# Patient Record
Sex: FEMALE | Race: WHITE | NOT HISPANIC OR LATINO | Employment: OTHER | URBAN - NONMETROPOLITAN AREA
[De-identification: names, ages, dates, MRNs, and addresses within clinical notes are randomized per-mention and may not be internally consistent; named-entity substitution may affect disease eponyms.]

---

## 2017-01-10 ENCOUNTER — OFFICE VISIT (OUTPATIENT)
Dept: NEUROLOGY | Facility: CLINIC | Age: 82
End: 2017-01-10

## 2017-01-10 VITALS
WEIGHT: 141 LBS | SYSTOLIC BLOOD PRESSURE: 110 MMHG | HEART RATE: 80 BPM | BODY MASS INDEX: 24.98 KG/M2 | HEIGHT: 63 IN | DIASTOLIC BLOOD PRESSURE: 78 MMHG

## 2017-01-10 DIAGNOSIS — I63.332 CEREBROVASCULAR ACCIDENT (CVA) DUE TO THROMBOSIS OF LEFT POSTERIOR CEREBRAL ARTERY (HCC): ICD-10-CM

## 2017-01-10 DIAGNOSIS — I82.5Z1 CHRONIC DEEP VEIN THROMBOSIS (DVT) OF DISTAL VEIN OF RIGHT LOWER EXTREMITY (HCC): ICD-10-CM

## 2017-01-10 DIAGNOSIS — C91.10 CLL (CHRONIC LYMPHOCYTIC LEUKEMIA) (HCC): ICD-10-CM

## 2017-01-10 DIAGNOSIS — M32.9 LUPUS (SYSTEMIC LUPUS ERYTHEMATOSUS) (HCC): ICD-10-CM

## 2017-01-10 DIAGNOSIS — I10 ESSENTIAL HYPERTENSION: ICD-10-CM

## 2017-01-10 DIAGNOSIS — I65.22 LEFT CAROTID STENOSIS: ICD-10-CM

## 2017-01-10 DIAGNOSIS — G45.9 TRANSIENT CEREBRAL ISCHEMIA, UNSPECIFIED TYPE: Primary | ICD-10-CM

## 2017-01-10 DIAGNOSIS — E78.5 DYSLIPIDEMIA: ICD-10-CM

## 2017-01-10 PROCEDURE — 99214 OFFICE O/P EST MOD 30 MIN: CPT | Performed by: CLINICAL NURSE SPECIALIST

## 2017-01-10 RX ORDER — DIAZEPAM 5 MG/1
5 TABLET ORAL 2 TIMES DAILY PRN
COMMUNITY

## 2017-01-10 RX ORDER — CHLORAL HYDRATE 500 MG
CAPSULE ORAL
COMMUNITY

## 2017-01-10 NOTE — PROGRESS NOTES
Subjective     Chief Complaint   Patient presents with   • Stroke     No new stroke like symptoms. Feels like she getting stronger.        Elizabeth Jones is a 83 y.o. female right handed retiree.  She is here today for follow up for TIA and stroke and was last seen 9/2016.   She was admitted 12/6/16 for abnormal speech and unfortunately was found to have a left occiptal stroke.  She has returned to her baseline.  She was discharged home but does continue to complain of imbalance but no falls. She lives alone.  At the time of her stroke she had gotten her medictions confused and had take extra doses of Xarelto.  She had no bleeding.  She was evaluated by Dr. GAMALIEL Camargo [facility].  Thre was concern for carotid stenosis and Dr. Watson was consulted. CTA neck showed 40-50% stenosis on RICAModerate calcified plaque present within the distal right common carotid artery in the region of the carotid bulb results in narrowing of 50% .  She did have hypercoag panel after discharge and I have reviewed and results are scanned.  The patient has returned to her baseline and has no complaints today.  Stroke   This is a new (left occipital stroke 12/ 2016) problem. The current episode started more than 1 month ago. The problem has been resolved (return to baseline). Pertinent negatives include no arthralgias, chest pain, coughing, fatigue, fever, headaches, myalgias, nausea, neck pain, numbness, sore throat, vomiting or weakness. Associated symptoms comments: Abnormal speech. Exacerbated by: dvt, carotid stenosis, dyslipidemia, HTN. Treatments tried: xarelto, statin. The treatment provided significant relief.        Current Outpatient Prescriptions   Medication Sig Dispense Refill   • aspirin  MG tablet Take 325 mg by mouth daily.     • diazePAM (VALIUM) 5 MG tablet Take 5 mg by mouth 2 (Two) Times a Day As Needed for anxiety.     • lisinopril-hydrochlorothiazide (PRINZIDE,ZESTORETIC) 10-12.5 MG per tablet Take 1 tablet by  mouth daily.     • Multiple Vitamins-Minerals (MULTIVITAMIN ADULT PO) Take  by mouth.     • Omega-3 Fatty Acids (FISH OIL) 1000 MG capsule capsule Take  by mouth Daily With Breakfast.     • omeprazole (PriLOSEC) 20 MG capsule Take 20 mg by mouth daily.     • rivaroxaban (XARELTO) 20 MG tablet Take 20 mg by mouth daily.     • simvastatin (ZOCOR) 20 MG tablet Take 40 mg by mouth Every Night.       No current facility-administered medications for this visit.        Past Medical History   Diagnosis Date   • CLL (chronic lymphocytic leukemia)    • Dyslipidemia    • Hypertension    • Lupus    • Shingles    • Sjogren-Trey syndrome    • Stroke    • TIA (transient ischemic attack)    • TIA (transient ischemic attack)        Past Surgical History   Procedure Laterality Date   • Appendectomy     • Hysterectomy     • Tonsillectomy         family history includes Cancer in her father; Heart disease in her mother.    Social History   Substance Use Topics   • Smoking status: Never Smoker   • Smokeless tobacco: Never Used   • Alcohol use No       Review of Systems   Constitutional: Negative.  Negative for fatigue and fever.   HENT: Negative.  Negative for nosebleeds, postnasal drip and sore throat.    Eyes: Negative.  Negative for visual disturbance.   Respiratory: Negative.  Negative for apnea, cough and shortness of breath.    Cardiovascular: Negative.  Negative for chest pain and leg swelling.   Gastrointestinal: Negative.  Negative for blood in stool, constipation, diarrhea, nausea and vomiting.   Endocrine: Negative.    Genitourinary: Negative.  Negative for dysuria.   Musculoskeletal: Positive for gait problem (imbalance). Negative for arthralgias, myalgias and neck pain.   Skin: Negative.    Allergic/Immunologic: Negative.    Neurological: Negative for dizziness, tremors, syncope, weakness, numbness and headaches.   Hematological: Negative.  Negative for adenopathy.   Psychiatric/Behavioral: Negative.  Negative for  "agitation and confusion.   All other systems reviewed and are negative.      Objective     Visit Vitals   • /78   • Pulse 80   • Ht 63\" (160 cm)   • Wt 141 lb (64 kg)   • BMI 24.98 kg/m2   , Body mass index is 24.98 kg/(m^2).    Physical Exam   Constitutional: She is oriented to person, place, and time. Vital signs are normal. She appears well-developed and well-nourished.   HENT:   Head: Normocephalic and atraumatic.   Right Ear: Hearing and external ear normal.   Left Ear: Hearing and external ear normal.   Nose: Nose normal.   Mouth/Throat: Uvula is midline, oropharynx is clear and moist and mucous membranes are normal.   Eyes: EOM and lids are normal. Pupils are equal, round, and reactive to light.   Neck: Trachea normal and normal range of motion. Neck supple. Carotid bruit is not present.   Cardiovascular: Normal rate, regular rhythm, S1 normal and S2 normal.    Murmur heard.  Pulmonary/Chest: Effort normal and breath sounds normal.   Abdominal: Soft. Bowel sounds are normal.   Musculoskeletal: Normal range of motion.   Neurological: She is alert and oriented to person, place, and time. She has normal strength and normal reflexes. No cranial nerve deficit or sensory deficit. She displays a negative Romberg sign. Gait (imbalance with gait) abnormal. Abnormal coordination: no ataxia finger to nose intact but difficulty wiht heel to shin.   Reflex Scores:       Tricep reflexes are 2+ on the right side and 2+ on the left side.       Bicep reflexes are 2+ on the right side and 2+ on the left side.       Brachioradialis reflexes are 2+ on the right side and 2+ on the left side.       Patellar reflexes are 2+ on the right side and 2+ on the left side.       Achilles reflexes are 2+ on the right side and 2+ on the left side.  CN II:  Visual fields full.  Pupils equally reactive to light  CN III, IV, VI:  Extraocular Muscles full with no signs of nystagmus  CN V:  Facial sensory is symmetric with no " asymetries.  CN VII:  Facial motor symmetric  CN VIII:  Gross hearing intact bilaterally  CN IX:  Palate elevates symmetrically  CN X:  Palate elevates symmetrically  CN XI:  Shoulder shrug symmetric  CN XII:  Tongue is midline on protrusion   Skin: Skin is warm and dry.   Psychiatric: She has a normal mood and affect. Her speech is normal and behavior is normal. Cognition and memory are normal.   Nursing note and vitals reviewed.      Results for orders placed or performed during the hospital encounter of 12/06/16   Comprehensive Metabolic Panel   Result Value Ref Range    Glucose 125 (H) 70 - 100 mg/dL    BUN 28 (H) 5 - 21 mg/dL    Creatinine 1.11 0.50 - 1.40 mg/dL    Sodium 134 (L) 135 - 145 mmol/L    Potassium 4.0 3.5 - 5.3 mmol/L    Chloride 95 (L) 98 - 110 mmol/L    CO2 26.0 24.0 - 31.0 mmol/L    Calcium 9.7 8.4 - 10.4 mg/dL    Total Protein 8.0 6.3 - 8.7 g/dL    Albumin 4.00 3.50 - 5.00 g/dL    ALT (SGPT) 33 0 - 54 U/L    AST (SGOT) 27 7 - 45 U/L    Alkaline Phosphatase 65 24 - 120 U/L    Total Bilirubin 0.4 0.1 - 1.0 mg/dL    eGFR Non African Amer 47 (L) >60 mL/min/1.73    Globulin 4.0 gm/dL    A/G Ratio 1.0 (L) 1.1 - 2.5 g/dL    BUN/Creatinine Ratio 25.2 (H) 7.0 - 25.0    Anion Gap 13.0 4.0 - 13.0 mmol/L   Protime-INR   Result Value Ref Range    Protime 18.9 (H) 11.9 - 14.6 Seconds    INR 1.53 (H) 0.91 - 1.09   aPTT   Result Value Ref Range    PTT 34.9 (H) 24.1 - 34.8 seconds   D-dimer, Quantitative   Result Value Ref Range    D-Dimer, Quantitative 0.61 (H) 0.00 - 0.50 mg/L (FEU)   BNP   Result Value Ref Range    proBNP 143.0 0.0 - 1800.0 pg/mL   Urinalysis With / Culture If Indicated   Result Value Ref Range    Color, UA Yellow Yellow, Straw    Appearance, UA Clear Clear    pH, UA 5.5 5.0 - 8.0    Specific Gravity, UA 1.017 1.005 - 1.030    Glucose, UA Negative Negative    Ketones, UA Negative Negative    Bilirubin, UA Negative Negative    Blood, UA Negative Negative    Protein, UA Negative Negative     Leuk Esterase, UA Negative Negative    Nitrite, UA Negative Negative    Urobilinogen, UA 0.2 E.U./dL 0.2 - 1.0 E.U./dL   Magnesium   Result Value Ref Range    Magnesium 1.7 1.4 - 2.2 mg/dL   CBC Auto Differential   Result Value Ref Range    WBC 5.94 4.80 - 10.80 10*3/mm3    RBC 4.09 (L) 4.20 - 5.40 10*6/mm3    Hemoglobin 11.7 (L) 12.0 - 16.0 g/dL    Hematocrit 35.1 (L) 37.0 - 47.0 %    MCV 85.8 82.0 - 98.0 fL    MCH 28.6 28.0 - 32.0 pg    MCHC 33.3 33.0 - 36.0 g/dL    RDW 15.2 (H) 12.0 - 15.0 %    RDW-SD 47.4 40.0 - 54.0 fl    MPV 10.5 6.0 - 12.0 fL    Platelets 290 130 - 400 10*3/mm3    Neutrophil % 66.0 39.0 - 78.0 %    Lymphocyte % 23.9 15.0 - 45.0 %    Monocyte % 9.1 4.0 - 12.0 %    Eosinophil % 0.5 0.0 - 4.0 %    Basophil % 0.3 0.0 - 2.0 %    Immature Grans % 0.2 0.0 - 5.0 %    Neutrophils, Absolute 3.92 1.87 - 8.40 10*3/mm3    Lymphocytes, Absolute 1.42 0.72 - 4.86 10*3/mm3    Monocytes, Absolute 0.54 0.19 - 1.30 10*3/mm3    Eosinophils, Absolute 0.03 0.00 - 0.70 10*3/mm3    Basophils, Absolute 0.02 0.00 - 0.20 10*3/mm3    Immature Grans, Absolute 0.01 0.00 - 0.03 10*3/mm3   Basic Metabolic Panel   Result Value Ref Range    Glucose 94 70 - 100 mg/dL    BUN 25 (H) 5 - 21 mg/dL    Creatinine 1.10 0.50 - 1.40 mg/dL    Sodium 136 135 - 145 mmol/L    Potassium 4.0 3.5 - 5.3 mmol/L    Chloride 98 98 - 110 mmol/L    CO2 31.0 24.0 - 31.0 mmol/L    Calcium 9.8 8.4 - 10.4 mg/dL    eGFR Non African Amer 47 (L) >60 mL/min/1.73    BUN/Creatinine Ratio 22.7 7.0 - 25.0    Anion Gap 7.0 4.0 - 13.0 mmol/L   Magnesium   Result Value Ref Range    Magnesium 1.8 1.4 - 2.2 mg/dL   Phosphorus   Result Value Ref Range    Phosphorus 3.2 2.5 - 4.5 mg/dL   Phosphorus   Result Value Ref Range    Phosphorus 3.6 2.5 - 4.5 mg/dL   Lipid Panel   Result Value Ref Range    Total Cholesterol 172 130 - 200 mg/dL    Triglycerides 125 0 - 149 mg/dL    HDL Cholesterol 31 (L) >=50 mg/dL    LDL Cholesterol  90 0 - 99 mg/dL    LDL/HDL Ratio 3.74     Basic Metabolic Panel   Result Value Ref Range    Glucose 92 70 - 100 mg/dL    BUN 23 (H) 5 - 21 mg/dL    Creatinine 1.11 0.50 - 1.40 mg/dL    Sodium 138 135 - 145 mmol/L    Potassium 4.1 3.5 - 5.3 mmol/L    Chloride 100 98 - 110 mmol/L    CO2 29.0 24.0 - 31.0 mmol/L    Calcium 9.4 8.4 - 10.4 mg/dL    eGFR Non African Amer 47 (L) >60 mL/min/1.73    BUN/Creatinine Ratio 20.7 7.0 - 25.0    Anion Gap 9.0 4.0 - 13.0 mmol/L   Basic Metabolic Panel   Result Value Ref Range    Glucose 96 70 - 100 mg/dL    BUN 21 5 - 21 mg/dL    Creatinine 1.18 0.50 - 1.40 mg/dL    Sodium 137 135 - 145 mmol/L    Potassium 4.0 3.5 - 5.3 mmol/L    Chloride 100 98 - 110 mmol/L    CO2 27.0 24.0 - 31.0 mmol/L    Calcium 9.2 8.4 - 10.4 mg/dL    eGFR Non African Amer 44 (L) >60 mL/min/1.73    BUN/Creatinine Ratio 17.8 7.0 - 25.0    Anion Gap 10.0 4.0 - 13.0 mmol/L   POC Troponin, Rapid   Result Value Ref Range    Troponin I 0.00 0.00 - 0.60 ng/mL   Echocardiogram 2D complete   Result Value Ref Range    IVSd 1.1 cm    LVIDd 3.6 cm    LVIDs 1.8 cm    LVPWd 1.2 cm    IVS/LVPW 0.98     FS 50.3 %    EDV(Teich) 54.4 ml    ESV(Teich) 9.6 ml    EF(Teich) 82.4 %    EDV(cubed) 46.7 ml    ESV(cubed) 5.7 ml    EF(cubed) 87.7 %    LV mass(C)d 132.7 grams    SV(Teich) 44.9 ml    SV(cubed) 40.9 ml    Ao root diam 2.8 cm    Ao root area 6.2 cm^2    LA dimension 3.0 cm    LA/Ao 1.1     LVOT diam 2.0 cm    LVOT area 3.1 cm^2    LVOT area(traced) 3.1 cm^2    MV E max harish 68.4 cm/sec    MV A max harish 115.0 cm/sec    MV E/A 0.59     MV dec time 0.25 sec    Ao pk harish 230.5 cm/sec    Ao max PG 21.3 mmHg    Ao max PG (full) 18.6 mmHg    Ao V2 mean 177.8 cm/sec    Ao mean PG 14.3 mmHg    Ao mean PG (full) 12.3 mmHg    Ao V2 VTI 53.2 cm    SHASHANK(I,A) 1.0 cm^2    SHASHANK(I,D) 1.0 cm^2    SHASHANK(V,A) 1.1 cm^2    SHASHANK(V,D) 1.1 cm^2    LV V1 max PG 2.7 mmHg    LV V1 mean PG 2.0 mmHg    LV V1 max 82.0 cm/sec    LV V1 mean 56.6 cm/sec    LV V1 VTI 17.2 cm    SV(Ao) 327.4 ml    SV(LVOT)  54.0 ml    LA volume 39.3 cm3    E/E' ratio 13.4     Lat Peak E' Dago 5.8 cm/sec    Med Peak E' Dago 5.11 cm/sec      The patient did have a hypercoagulable panel that was sent to me and scanned. Panel is negative with elevated AT3 which is expected with a patient taking Xarelto.    MRI BRAIN: IMPRESSIONS:  1. A small cluster of approximately four areas of subacute to acute  nonhemorrhagic infarction involving the left occipital lobe.  2. Extensive probable chronic small vessel changes in both hemispheres  and within the brainstem.  3. Scattered areas of small lacunar infarct on the right and left as  described.  4. Atrophy. No hemorrhage or midline shift.  5. No major vessel pathology demonstrated on enhanced images of the  vessels.   6. MRA images will be reported separately.  This report was finalized on 12/08/2016 16:01 by Dr. Dino Wiggins MD      CAROTID DUPLEX:IMPRESSION:  Impression:  1. There is 50-69% stenosis of the right internal carotid artery.  2. There is less than 50% stenosis of the left internal carotid artery.  3. Antegrade flow is demonstrated in bilateral vertebral arteries.      CTA NECK:IMPRESSION:  Atherosclerotic changes as described in detail above. Moderate calcified  plaque present within the distal right common carotid artery in the  region of the carotid bulb results in narrowing of 50%. Narrowing of the  proximal right internal carotid artery is 40-50%. Widely patent left  extracranial carotid arteries. Dominant right vertebral artery. No  aneurysm or dissection.      2DECHO:Interpretation Summary      · All left ventricular wall segments contract normally.  · Left ventricular diastolic dysfunction (grade I) consistent with impaired relaxation.  · Mild aortic valve regurgitation is present.      NORMAL LV AND RV SIZE AND SYSTOLIC FUNCTION  MILD AORTIC VALVE STENOSIS         CT HEAD:IMPRESSION:  Moderate cerebral and cerebellar volume loss with chronic microvascular  disease but no  evidence of acute intracranial process.        MRA HEAD: IMPRESSION:  There is diminished signal within the proximal basilar artery which may  indicate atherosclerotic change and/or flow artifact. The mid and distal  basilar artery appear normal. The bilateral posterior as well as the  middle and left cerebral arteries are normal in appearance. 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 No aneurysm.  This report was finalized on 2016 17:12 by Dr. Jen Barfield MD.    ASSESSMENT/PLAN    Diagnoses and all orders for this visit:    Transient cerebral ischemia, unspecified type    Essential hypertension    Chronic deep vein thrombosis (DVT) of distal vein of right lower extremity    CLL (chronic lymphocytic leukemia)    Lupus (systemic lupus erythematosus)    Cerebrovascular accident (CVA) due to thrombosis of left posterior cerebral artery    Left carotid stenosis    Other orders  -     Omega-3 Fatty Acids (FISH OIL) 1000 MG capsule capsule; Take  by mouth Daily With Breakfast.  -     diazePAM (VALIUM) 5 MG tablet; Take 5 mg by mouth 2 (Two) Times a Day As Needed for anxiety.    MEDICAL DECISION MAKIN. Continue with Xarelto  2. Continue with BP management for systolic JONATAN goal < 140.  3. Continue with statin therapy and LDL goal < 70  4. I had recommended home physical therapy but patient declines.  5. Patient is counseled on stroke signs and symptoms using FAST and Time Saved is Brain Saved.  6. Discussed secondary stroke prevention to include a systolic blood pressure goal of less than 140, LDL goal less than 70, and 30-40 minutes of heart rate elevating exercise 3-4 times per week. Continue antiplatelet.     Return in about 3 months (around 4/10/2017).        Wendy Soliz, LUC

## 2017-01-10 NOTE — PATIENT INSTRUCTIONS
Stroke Prevention  Some medical conditions and behaviors are associated with an increased chance of having a stroke. You may prevent a stroke by making healthy choices and managing medical conditions.  HOW CAN I REDUCE MY RISK OF HAVING A STROKE?   · Stay physically active. Get at least 30 minutes of activity on most or all days.  · Do not smoke. It may also be helpful to avoid exposure to secondhand smoke.  · Limit alcohol use. Moderate alcohol use is considered to be:  ¨ No more than 2 drinks per day for men.  ¨ No more than 1 drink per day for nonpregnant women.  · Eat healthy foods. This involves:  ¨ Eating 5 or more servings of fruits and vegetables a day.  ¨ Making dietary changes that address high blood pressure (hypertension), high cholesterol, diabetes, or obesity.  · Manage your cholesterol levels.  ¨ Making food choices that are high in fiber and low in saturated fat, trans fat, and cholesterol may control cholesterol levels.  ¨ Take any prescribed medicines to control cholesterol as directed by your health care provider.  · Manage your diabetes.  ¨ Controlling your carbohydrate and sugar intake is recommended to manage diabetes.  ¨ Take any prescribed medicines to control diabetes as directed by your health care provider.  · Control your hypertension.  ¨ Making food choices that are low in salt (sodium), saturated fat, trans fat, and cholesterol is recommended to manage hypertension.  ¨ Ask your health care provider if you need treatment to lower your blood pressure. Take any prescribed medicines to control hypertension as directed by your health care provider.  ¨ If you are 18-39 years of age, have your blood pressure checked every 3-5 years. If you are 40 years of age or older, have your blood pressure checked every year.  · Maintain a healthy weight.  ¨ Reducing calorie intake and making food choices that are low in sodium, saturated fat, trans fat, and cholesterol are recommended to manage  weight.  · Stop drug abuse.  · Avoid taking birth control pills.  ¨ Talk to your health care provider about the risks of taking birth control pills if you are over 35 years old, smoke, get migraines, or have ever had a blood clot.  · Get evaluated for sleep disorders (sleep apnea).  ¨ Talk to your health care provider about getting a sleep evaluation if you snore a lot or have excessive sleepiness.  · Take medicines only as directed by your health care provider.  ¨ For some people, aspirin or blood thinners (anticoagulants) are helpful in reducing the risk of forming abnormal blood clots that can lead to stroke. If you have the irregular heart rhythm of atrial fibrillation, you should be on a blood thinner unless there is a good reason you cannot take them.  ¨ Understand all your medicine instructions.  · Make sure that other conditions (such as anemia or atherosclerosis) are addressed.  SEEK IMMEDIATE MEDICAL CARE IF:   · You have sudden weakness or numbness of the face, arm, or leg, especially on one side of the body.  · Your face or eyelid droops to one side.  · You have sudden confusion.  · You have trouble speaking (aphasia) or understanding.  · You have sudden trouble seeing in one or both eyes.  · You have sudden trouble walking.  · You have dizziness.  · You have a loss of balance or coordination.  · You have a sudden, severe headache with no known cause.  · You have new chest pain or an irregular heartbeat.  Any of these symptoms may represent a serious problem that is an emergency. Do not wait to see if the symptoms will go away. Get medical help at once. Call your local emergency services (911 in [place] Do not drive yourself to the hospital.     This information is not intended to replace advice given to you by your health care provider. Make sure you discuss any questions you have with your health care provider.     Document Released: 01/25/2006 Document Revised: 01/08/2016 Document Reviewed:  06/20/2014  [employer] Interactive Patient Education ©2016 [employer]

## 2017-03-22 ENCOUNTER — TELEPHONE (OUTPATIENT)
Dept: NEUROLOGY | Facility: CLINIC | Age: 82
End: 2017-03-22

## 2017-04-11 ENCOUNTER — OFFICE VISIT (OUTPATIENT)
Dept: NEUROLOGY | Facility: CLINIC | Age: 82
End: 2017-04-11

## 2017-04-11 VITALS
DIASTOLIC BLOOD PRESSURE: 78 MMHG | WEIGHT: 147 LBS | SYSTOLIC BLOOD PRESSURE: 128 MMHG | HEART RATE: 84 BPM | BODY MASS INDEX: 26.05 KG/M2 | HEIGHT: 63 IN

## 2017-04-11 DIAGNOSIS — C91.10 CLL (CHRONIC LYMPHOCYTIC LEUKEMIA) (HCC): ICD-10-CM

## 2017-04-11 DIAGNOSIS — R26.89 IMBALANCE: ICD-10-CM

## 2017-04-11 DIAGNOSIS — I82.5Z1 CHRONIC DEEP VEIN THROMBOSIS (DVT) OF DISTAL VEIN OF RIGHT LOWER EXTREMITY (HCC): ICD-10-CM

## 2017-04-11 DIAGNOSIS — E78.5 DYSLIPIDEMIA: ICD-10-CM

## 2017-04-11 DIAGNOSIS — G60.9 IDIOPATHIC PERIPHERAL NEUROPATHY: ICD-10-CM

## 2017-04-11 DIAGNOSIS — I10 ESSENTIAL HYPERTENSION: ICD-10-CM

## 2017-04-11 DIAGNOSIS — G45.9 TRANSIENT CEREBRAL ISCHEMIA, UNSPECIFIED TYPE: Primary | ICD-10-CM

## 2017-04-11 DIAGNOSIS — I63.332 CEREBROVASCULAR ACCIDENT (CVA) DUE TO THROMBOSIS OF LEFT POSTERIOR CEREBRAL ARTERY (HCC): ICD-10-CM

## 2017-04-11 DIAGNOSIS — I65.22 LEFT CAROTID STENOSIS: ICD-10-CM

## 2017-04-11 PROCEDURE — 99213 OFFICE O/P EST LOW 20 MIN: CPT | Performed by: CLINICAL NURSE SPECIALIST

## 2017-04-11 NOTE — PROGRESS NOTES
Subjective     Chief Complaint   Patient presents with   • Neurologic Problem     3 month f/u stroke/Pt states that she feels that everything is the same/unchanged since her last visit.  Pt is R handed.       Elizabeth Jones is a 83 y.o. female right handed retiree.  She is here today for follow up for TIA and stroke. She was last seen 1/10/17 after having a stroke 12/2016. She has hx of TIA in 2015.  She has returned to her baseline. She has hx of DVT and does take Xarelto and denies bleeding.  She does complain of imbalance. She has history of peripheral neuropathy and this is non concerning to her and she denies discomfort. I had offered/recommended PT in the past and I have offered again today and patient declines.  She does have history of bilateral carotid stenosis.    She denies new stroke symptoms and has no complaints today.      Neurologic Problem   The patient's pertinent negatives include no syncope or weakness. Primary symptoms comment: peripheral neuropathy. This is a chronic problem. The current episode started more than 1 year ago. The neurological problem developed gradually. The problem has been gradually worsening since onset. There was lower extremity (bilateral) focality noted. Pertinent negatives include no chest pain, confusion, dizziness, fatigue, fever, headaches, nausea, neck pain, shortness of breath or vomiting. (Numbness of bilateral LE distally to proximal to below knees) Past treatments include nothing. Her past medical history is significant for a CVA. (Hx DVt, HTN,GERD)   Stroke   This is a new (left occipital stroke 12/ 2016, TIA 2015) problem. The current episode started more than 1 month ago. The problem has been resolved (return to baseline). Pertinent negatives include no arthralgias, chest pain, coughing, fatigue, fever, headaches, myalgias, nausea, neck pain, numbness, sore throat, vomiting or weakness. Associated symptoms comments: Abnormal speech. Exacerbated by: dvt, carotid  stenosis, dyslipidemia, HTN. Treatments tried: xarelto, statin. The treatment provided significant relief.        Current Outpatient Prescriptions   Medication Sig Dispense Refill   • aspirin  MG tablet Take 325 mg by mouth daily.     • diazePAM (VALIUM) 5 MG tablet Take 5 mg by mouth 2 (Two) Times a Day As Needed for anxiety.     • lisinopril-hydrochlorothiazide (PRINZIDE,ZESTORETIC) 10-12.5 MG per tablet Take 1 tablet by mouth daily.     • Multiple Vitamins-Minerals (MULTIVITAMIN ADULT PO) Take  by mouth.     • Omega-3 Fatty Acids (FISH OIL) 1000 MG capsule capsule Take  by mouth Daily With Breakfast.     • omeprazole (PriLOSEC) 20 MG capsule Take 20 mg by mouth daily.     • rivaroxaban (XARELTO) 20 MG tablet Take 20 mg by mouth daily.     • simvastatin (ZOCOR) 20 MG tablet Take 40 mg by mouth Every Night.       No current facility-administered medications for this visit.        Past Medical History:   Diagnosis Date   • CLL (chronic lymphocytic leukemia)    • Dyslipidemia    • Hypertension    • Lupus    • Shingles    • Sjogren-Trey syndrome    • Stroke    • TIA (transient ischemic attack)    • TIA (transient ischemic attack)        Past Surgical History:   Procedure Laterality Date   • APPENDECTOMY     • HYSTERECTOMY     • TONSILLECTOMY         family history includes Cancer in her father; Heart disease in her mother.    Social History   Substance Use Topics   • Smoking status: Never Smoker   • Smokeless tobacco: Never Used   • Alcohol use No       Review of Systems   Constitutional: Negative.  Negative for fatigue and fever.   HENT: Negative.  Negative for nosebleeds, postnasal drip and sore throat.    Eyes: Negative.  Negative for visual disturbance.   Respiratory: Negative.  Negative for apnea, cough and shortness of breath.    Cardiovascular: Negative.  Negative for chest pain and leg swelling.   Gastrointestinal: Negative.  Negative for blood in stool, constipation, diarrhea, nausea and vomiting.  "  Endocrine: Negative.    Genitourinary: Negative.  Negative for dysuria.   Musculoskeletal: Positive for gait problem (imbalance). Negative for arthralgias, myalgias and neck pain.   Skin: Negative.    Allergic/Immunologic: Negative.    Neurological: Negative for dizziness, tremors, syncope, weakness, numbness and headaches.   Hematological: Negative.  Negative for adenopathy.   Psychiatric/Behavioral: Negative.  Negative for agitation and confusion.   All other systems reviewed and are negative.      Objective     /78  Pulse 84  Ht 63\" (160 cm)  Wt 147 lb (66.7 kg)  BMI 26.04 kg/m2, Body mass index is 26.04 kg/(m^2).    Physical Exam   Constitutional: She is oriented to person, place, and time. Vital signs are normal. She appears well-developed and well-nourished.   HENT:   Head: Normocephalic and atraumatic.   Right Ear: Hearing and external ear normal.   Left Ear: Hearing and external ear normal.   Nose: Nose normal.   Mouth/Throat: Uvula is midline, oropharynx is clear and moist and mucous membranes are normal.   Eyes: EOM and lids are normal. Pupils are equal, round, and reactive to light.   Neck: Trachea normal and normal range of motion. Neck supple. Carotid bruit is not present.   Cardiovascular: Normal rate, regular rhythm, S1 normal and S2 normal.    Murmur heard.  Pulmonary/Chest: Effort normal and breath sounds normal.   Abdominal: Soft. Bowel sounds are normal.   Musculoskeletal: Normal range of motion.       Neurological Sensory Findings - Altered hot/cold left ankle/foot discrimination (distal to proximal to below the knee).Altered hot/cold right ankle/foot discrimination: distal to proximal to below the knee. Altered sharp/dull right ankle/foot discrimination (distal to proximal to below the knee) and altered sharp/dull left ankle/foot discrimination (distal to proximal to below the knee).  Neurological: She is alert and oriented to person, place, and time. She has normal strength and " normal reflexes. No cranial nerve deficit or sensory deficit. She displays a negative Romberg sign. Gait (imbalance with gait) abnormal. Abnormal coordination: no ataxia finger to nose intact but difficulty wiht heel to shin.   Reflex Scores:       Tricep reflexes are 2+ on the right side and 2+ on the left side.       Bicep reflexes are 2+ on the right side and 2+ on the left side.       Brachioradialis reflexes are 2+ on the right side and 2+ on the left side.       Patellar reflexes are 2+ on the right side and 2+ on the left side.       Achilles reflexes are 2+ on the right side and 2+ on the left side.  CN II:  Visual fields full.  Pupils equally reactive to light  CN III, IV, VI:  Extraocular Muscles full with no signs of nystagmus  CN V:  Facial sensory is symmetric with no asymetries.  CN VII:  Facial motor symmetric  CN VIII:  Gross hearing intact bilaterally  CN IX:  Palate elevates symmetrically  CN X:  Palate elevates symmetrically  CN XI:  Shoulder shrug symmetric  CN XII:  Tongue is midline on protrusion   Skin: Skin is warm and dry.   Psychiatric: She has a normal mood and affect. Her speech is normal and behavior is normal. Cognition and memory are normal.   Nursing note and vitals reviewed.           ASSESSMENT/PLAN    Diagnoses and all orders for this visit:    Transient cerebral ischemia, unspecified type    Essential hypertension    Cerebrovascular accident (CVA) due to thrombosis of left posterior cerebral artery    Left carotid stenosis    CLL (chronic lymphocytic leukemia)    Chronic deep vein thrombosis (DVT) of distal vein of right lower extremity    Dyslipidemia    Imbalance    Idiopathic peripheral neuropathy    MEDICAL DECISION MAKIN. Continue with Xarelto per PCP  2. Continue with BP management for systolic BP goal < 140. Managed by PCP  3. Continue with statin therapy and LDL goal < 70 managed by PCP  4. I had recommended home physical therapy but patient declines. I did explain  she is at risk for fall.  5. Patient is counseled on stroke signs and symptoms using FAST and Time Saved is Brain Saved.  6. I did consider treatment for peripheral neuropathy and really is not uncomfortable or concerning the the patient. Counseled on general treatment and care of feet   7. Patient denies back pain and declines further treatment at this time for peripheral neuropathy.  8. Monitor carotid with duplex scan annually which will be 12/2017.   9. Secondary stroke prevention discussed.      allergies and all known medications/prescriptions have been reviewed using resources available on this encounter.    Return in about 6 months (around 10/11/2017).        Wendy Soliz, APRN

## 2017-10-16 ENCOUNTER — OFFICE VISIT (OUTPATIENT)
Dept: NEUROLOGY | Facility: CLINIC | Age: 82
End: 2017-10-16

## 2017-10-16 VITALS
SYSTOLIC BLOOD PRESSURE: 120 MMHG | HEART RATE: 76 BPM | WEIGHT: 151 LBS | DIASTOLIC BLOOD PRESSURE: 80 MMHG | HEIGHT: 63 IN | BODY MASS INDEX: 26.75 KG/M2

## 2017-10-16 DIAGNOSIS — E78.5 DYSLIPIDEMIA: ICD-10-CM

## 2017-10-16 DIAGNOSIS — I10 ESSENTIAL HYPERTENSION: ICD-10-CM

## 2017-10-16 DIAGNOSIS — I63.332 CEREBROVASCULAR ACCIDENT (CVA) DUE TO THROMBOSIS OF LEFT POSTERIOR CEREBRAL ARTERY (HCC): Primary | ICD-10-CM

## 2017-10-16 DIAGNOSIS — I82.5Z1 CHRONIC DEEP VEIN THROMBOSIS (DVT) OF DISTAL VEIN OF RIGHT LOWER EXTREMITY (HCC): ICD-10-CM

## 2017-10-16 DIAGNOSIS — M32.9 SYSTEMIC LUPUS ERYTHEMATOSUS, UNSPECIFIED SLE TYPE, UNSPECIFIED ORGAN INVOLVEMENT STATUS (HCC): ICD-10-CM

## 2017-10-16 DIAGNOSIS — R26.89 IMBALANCE: ICD-10-CM

## 2017-10-16 DIAGNOSIS — I65.23 BILATERAL CAROTID ARTERY STENOSIS: ICD-10-CM

## 2017-10-16 PROCEDURE — 99214 OFFICE O/P EST MOD 30 MIN: CPT | Performed by: CLINICAL NURSE SPECIALIST

## 2017-10-16 RX ORDER — METOPROLOL SUCCINATE 25 MG/1
25 TABLET, EXTENDED RELEASE ORAL DAILY
COMMUNITY

## 2017-10-16 NOTE — PROGRESS NOTES
Subjective     Chief Complaint   Patient presents with   • Stroke     No new stroke symptoms   • Peripheral Neuropathy     Feet- this has not worsened but she is concerned with her swelling currently. She has NOT discussed this with her PCP yet. She does not have a f/u with him until first of the year.          Elizabeth Jones is a 84 y.o. female right handed retiree.  She is here today for follow up for TIA and stroke. She was last seen 4/17 after having a stroke 12/2016. She has hx of TIA in 2015.  She has returned to her baseline. She has hx of DVT and does take Xarelto and denies bleeding.  She does complain of imbalance. She has history of peripheral neuropathy and this is non concerning to her and she denies discomfort..  She does have history of bilateral carotid stenosis. She has noticed some pedal edema but has not seen her PCP. She does also have occasional right lower extremity pain and mild edema. She denies new stroke symptoms and denies unilateral weakness, numbness, facial weakness, speech changes.     Stroke   This is a new (left occipital stroke 12/ 2016, TIA 2015) problem. The current episode started more than 1 month ago. The problem has been resolved (return to baseline). Pertinent negatives include no arthralgias, chest pain, coughing, fatigue, fever, headaches, myalgias, nausea, neck pain, numbness, sore throat, vomiting or weakness. Associated symptoms comments: Abnormal speech. Exacerbated by: dvt, carotid stenosis, dyslipidemia, HTN. Treatments tried: xarelto, statin. The treatment provided significant relief.   Peripheral Neuropathy   This is a chronic problem. The current episode started more than 1 year ago. The problem occurs daily. Pertinent negatives include no arthralgias, chest pain, coughing, fatigue, fever, headaches, myalgias, nausea, neck pain, numbness, sore throat, vomiting or weakness. Associated symptoms comments: Numbness of bilateral LE distally to proximal to below knees.  She has tried nothing for the symptoms. The treatment provided significant relief.   Neurologic Problem   The patient's pertinent negatives include no syncope or weakness. Primary symptoms comment: peripheral neuropathy. This is a chronic problem. The current episode started more than 1 year ago. The neurological problem developed gradually. The problem has been gradually worsening since onset. There was lower extremity (bilateral) focality noted. Pertinent negatives include no chest pain, confusion, dizziness, fatigue, fever, headaches, nausea, neck pain, shortness of breath or vomiting. (Numbness of bilateral LE distally to proximal to below knees) Past treatments include nothing. Her past medical history is significant for a CVA. (Hx DVt, HTN,GERD)        Current Outpatient Prescriptions   Medication Sig Dispense Refill   • aspirin  MG tablet Take 325 mg by mouth daily.     • diazePAM (VALIUM) 5 MG tablet Take 5 mg by mouth 2 (Two) Times a Day As Needed for anxiety.     • metoprolol succinate XL (TOPROL-XL) 25 MG 24 hr tablet Take 25 mg by mouth Daily.     • Multiple Vitamins-Minerals (MULTIVITAMIN ADULT PO) Take  by mouth.     • Omega-3 Fatty Acids (FISH OIL) 1000 MG capsule capsule Take  by mouth Daily With Breakfast.     • omeprazole (PriLOSEC) 20 MG capsule Take 20 mg by mouth daily.     • rivaroxaban (XARELTO) 20 MG tablet Take 20 mg by mouth daily.     • simvastatin (ZOCOR) 20 MG tablet Take 40 mg by mouth Every Night.       No current facility-administered medications for this visit.        Past Medical History:   Diagnosis Date   • CLL (chronic lymphocytic leukemia)    • Dyslipidemia    • Hypertension    • Lupus    • Shingles    • Sjogren-Trey syndrome    • Stroke    • TIA (transient ischemic attack)    • TIA (transient ischemic attack)        Past Surgical History:   Procedure Laterality Date   • APPENDECTOMY     • HYSTERECTOMY     • TONSILLECTOMY         family history includes Cancer in her  "father; Heart disease in her mother.    Social History   Substance Use Topics   • Smoking status: Never Smoker   • Smokeless tobacco: Never Used   • Alcohol use No       Review of Systems   Constitutional: Negative.  Negative for fatigue, fever and unexpected weight change.   HENT: Negative.  Negative for nosebleeds, postnasal drip and sore throat.    Eyes: Negative.  Negative for visual disturbance.   Respiratory: Negative.  Negative for apnea, cough and shortness of breath.    Cardiovascular: Positive for leg swelling. Negative for chest pain.   Gastrointestinal: Negative.  Negative for blood in stool, constipation, diarrhea, nausea and vomiting.   Endocrine: Negative.    Genitourinary: Negative.  Negative for dysuria and frequency.   Musculoskeletal: Positive for gait problem (imbalance). Negative for arthralgias, myalgias and neck pain.   Skin: Negative.    Allergic/Immunologic: Negative.    Neurological: Negative for dizziness, tremors, syncope, weakness, numbness and headaches.   Hematological: Negative.  Negative for adenopathy.   Psychiatric/Behavioral: Negative.  Negative for agitation and confusion.   All other systems reviewed and are negative.      Objective     /80  Pulse 76  Ht 63\" (160 cm)  Wt 151 lb (68.5 kg)  BMI 26.75 kg/m2, Body mass index is 26.75 kg/(m^2).    Physical Exam   Constitutional: She is oriented to person, place, and time. Vital signs are normal. She appears well-developed and well-nourished.   HENT:   Head: Normocephalic and atraumatic.   Right Ear: Hearing and external ear normal.   Left Ear: Hearing and external ear normal.   Nose: Nose normal.   Mouth/Throat: Uvula is midline, oropharynx is clear and moist and mucous membranes are normal.   Eyes: Conjunctivae, EOM and lids are normal. Pupils are equal, round, and reactive to light.   Neck: Trachea normal and normal range of motion. Neck supple. Carotid bruit is not present.   Cardiovascular: Normal rate, regular rhythm, " S1 normal and S2 normal.    Murmur heard.  Pulses:       Dorsalis pedis pulses are 1+ on the right side, and 1+ on the left side.        Posterior tibial pulses are 1+ on the right side, and 1+ on the left side.   Pulmonary/Chest: Effort normal and breath sounds normal.   Abdominal: Soft. Bowel sounds are normal.   Musculoskeletal: Normal range of motion.       Neurological Sensory Findings - Altered hot/cold left ankle/foot discrimination (distal to proximal to below the knee).Altered hot/cold right ankle/foot discrimination: distal to proximal to below the knee. Altered sharp/dull right ankle/foot discrimination (distal to proximal to below the knee) and altered sharp/dull left ankle/foot discrimination (distal to proximal to below the knee).    Vascular Status -  Her exam exhibits right foot edema (1+). Her exam exhibits left foot edema (1+).  Neurological: She is alert and oriented to person, place, and time. She has normal strength and normal reflexes. No cranial nerve deficit or sensory deficit. She displays a negative Romberg sign. Gait (imbalance with gait) abnormal. Abnormal coordination: no ataxia finger to nose intact but difficulty wiht heel to shin.   Reflex Scores:       Tricep reflexes are 2+ on the right side and 2+ on the left side.       Bicep reflexes are 2+ on the right side and 2+ on the left side.       Brachioradialis reflexes are 2+ on the right side and 2+ on the left side.       Patellar reflexes are 2+ on the right side and 2+ on the left side.       Achilles reflexes are 2+ on the right side and 2+ on the left side.  CN II:  Visual fields full.  Pupils equally reactive to light  CN III, IV, VI:  Extraocular Muscles full with no signs of nystagmus  CN V:  Facial sensory is symmetric with no asymetries.  CN VII:  Facial motor symmetric  CN VIII:  Gross hearing intact bilaterally  CN IX:  Palate elevates symmetrically  CN X:  Palate elevates symmetrically  CN XI:  Shoulder shrug  symmetric  CN XII:  Tongue is midline on protrusion   Skin: Skin is warm and dry.   Psychiatric: She has a normal mood and affect. Her speech is normal and behavior is normal. Cognition and memory are normal.   Nursing note and vitals reviewed.           ASSESSMENT/PLAN    Diagnoses and all orders for this visit:    Cerebrovascular accident (CVA) due to thrombosis of left posterior cerebral artery    Essential hypertension    Systemic lupus erythematosus, unspecified SLE type, unspecified organ involvement status    Dyslipidemia    Imbalance    Bilateral carotid artery stenosis  -     US Carotid Bilateral; Future    Chronic deep vein thrombosis (DVT) of distal vein of right lower extremity  -     US venous doppler lower extremity bilateral (duplex); Future    Other orders  -     metoprolol succinate XL (TOPROL-XL) 25 MG 24 hr tablet; Take 25 mg by mouth Daily.    MEDICAL DECISION MAKIN. Continue with Xarelto per PCP  2. Continue with BP management for systolic BP goal < 140. Managed by PCP  3. Continue with statin therapy and LDL goal < 70 managed by PCP  4. Obtain carotid duplex scan and venous dopler bilateral LE hx DVT for pain/mild edema RLE  5. Patient is counseled on stroke signs and symptoms using FAST and Time Saved is Brain Saved.  6. I did consider treatment for peripheral neuropathy and really is not uncomfortable or concerning the the patient. Counseled on general treatment and care of feet   7. Patient denies back pain and declines further treatment at this time for peripheral neuropathy.  8. Repeat carotid duplex  9. Secondary stroke prevention discussed.         allergies and all known medications/prescriptions have been reviewed using resources available on this encounter.    Return in about 6 months (around 2018).        LUC Hay

## 2017-10-16 NOTE — PATIENT INSTRUCTIONS
BMI for Adults  Body mass index (BMI) is a number that is calculated from a person's weight and height. In most adults, the number is used to find how much of an adult's weight is made up of fat. BMI is not as accurate as a direct measure of body fat.  HOW IS BMI CALCULATED?  BMI is calculated by dividing weight in kilograms by height in meters squared. It can also be calculated by dividing weight in pounds by height in inches squared, then multiplying the resulting number by 703. Charts are available to help you find your BMI quickly and easily without doing this calculation.   HOW IS BMI INTERPRETED?  Health care professionals use BMI charts to identify whether an adult is underweight, at a normal weight, or overweight based on the following guidelines:  · Underweight: BMI less than 18.5.  · Normal weight: BMI between 18.5 and 24.9.  · Overweight: BMI between 25 and 29.9.  · Obese: BMI of 30 and above.  BMI is usually interpreted the same for males and females.  Weight includes both fat and muscle, so someone with a muscular build, such as an athlete, may have a BMI that is higher than 24.9. In cases like these, BMI may not accurately depict body fat. To determine if excess body fat is the cause of a BMI of 25 or higher, further assessments may need to be done by a health care provider.  WHY IS BMI A USEFUL TOOL?  BMI is used to identify a possible weight problem that may be related to a medical problem or may increase the risk for medical problems. BMI can also be used to promote changes to reach a healthy weight.     This information is not intended to replace advice given to you by your health care provider. Make sure you discuss any questions you have with your health care provider.     Document Released: 08/29/2005 Document Revised: 01/08/2016 Document Reviewed: 05/15/2015  [employer] Interactive Patient Education ©2017 [employer]  Stroke Prevention  Some medical conditions and behaviors are associated with  an increased chance of having a stroke. You may prevent a stroke by making healthy choices and managing medical conditions.  HOW CAN I REDUCE MY RISK OF HAVING A STROKE?   · Stay physically active. Get at least 30 minutes of activity on most or all days.  · Do not smoke. It may also be helpful to avoid exposure to secondhand smoke.  · Limit alcohol use. Moderate alcohol use is considered to be:  ¨ No more than 2 drinks per day for men.  ¨ No more than 1 drink per day for nonpregnant women.  · Eat healthy foods. This involves:  ¨ Eating 5 or more servings of fruits and vegetables a day.  ¨ Making dietary changes that address high blood pressure (hypertension), high cholesterol, diabetes, or obesity.  · Manage your cholesterol levels.  ¨ Making food choices that are high in fiber and low in saturated fat, trans fat, and cholesterol may control cholesterol levels.  ¨ Take any prescribed medicines to control cholesterol as directed by your health care provider.  · Manage your diabetes.  ¨ Controlling your carbohydrate and sugar intake is recommended to manage diabetes.  ¨ Take any prescribed medicines to control diabetes as directed by your health care provider.  · Control your hypertension.  ¨ Making food choices that are low in salt (sodium), saturated fat, trans fat, and cholesterol is recommended to manage hypertension.  ¨ Ask your health care provider if you need treatment to lower your blood pressure. Take any prescribed medicines to control hypertension as directed by your health care provider.  ¨ If you are 18-39 years of age, have your blood pressure checked every 3-5 years. If you are 40 years of age or older, have your blood pressure checked every year.  · Maintain a healthy weight.  ¨ Reducing calorie intake and making food choices that are low in sodium, saturated fat, trans fat, and cholesterol are recommended to manage weight.  · Stop drug abuse.  · Avoid taking birth control pills.  ¨ Talk to your health  care provider about the risks of taking birth control pills if you are over 35 years old, smoke, get migraines, or have ever had a blood clot.  · Get evaluated for sleep disorders (sleep apnea).  ¨ Talk to your health care provider about getting a sleep evaluation if you snore a lot or have excessive sleepiness.  · Take medicines only as directed by your health care provider.  ¨ For some people, aspirin or blood thinners (anticoagulants) are helpful in reducing the risk of forming abnormal blood clots that can lead to stroke. If you have the irregular heart rhythm of atrial fibrillation, you should be on a blood thinner unless there is a good reason you cannot take them.  ¨ Understand all your medicine instructions.  · Make sure that other conditions (such as anemia or atherosclerosis) are addressed.  SEEK IMMEDIATE MEDICAL CARE IF:   · You have sudden weakness or numbness of the face, arm, or leg, especially on one side of the body.  · Your face or eyelid droops to one side.  · You have sudden confusion.  · You have trouble speaking (aphasia) or understanding.  · You have sudden trouble seeing in one or both eyes.  · You have sudden trouble walking.  · You have dizziness.  · You have a loss of balance or coordination.  · You have a sudden, severe headache with no known cause.  · You have new chest pain or an irregular heartbeat.  Any of these symptoms may represent a serious problem that is an emergency. Do not wait to see if the symptoms will go away. Get medical help at once. Call your local emergency services (911 in [place] Do not drive yourself to the hospital.     This information is not intended to replace advice given to you by your health care provider. Make sure you discuss any questions you have with your health care provider.     Document Released: 01/25/2006 Document Revised: 01/08/2016 Document Reviewed: 06/20/2014  [employer] Interactive Patient Education ©2017 [employer]

## 2017-10-24 ENCOUNTER — HOSPITAL ENCOUNTER (OUTPATIENT)
Dept: ULTRASOUND IMAGING | Facility: HOSPITAL | Age: 82
Discharge: HOME | End: 2017-10-24

## 2017-10-24 ENCOUNTER — HOSPITAL ENCOUNTER (EMERGENCY)
Facility: HOSPITAL | Age: 82
Discharge: STILL ADMITTED OR ADMINISTRATIVE | End: 2017-10-24

## 2017-10-24 ENCOUNTER — HOSPITAL ENCOUNTER (OUTPATIENT)
Dept: ULTRASOUND IMAGING | Facility: HOSPITAL | Age: 82
Discharge: HOME | End: 2017-10-24
Admitting: CLINICAL NURSE SPECIALIST

## 2017-10-24 DIAGNOSIS — I65.23 BILATERAL CAROTID ARTERY STENOSIS: ICD-10-CM

## 2017-10-24 DIAGNOSIS — I82.5Z1 CHRONIC DEEP VEIN THROMBOSIS (DVT) OF DISTAL VEIN OF RIGHT LOWER EXTREMITY (HCC): ICD-10-CM

## 2017-10-24 PROCEDURE — 93970 EXTREMITY STUDY: CPT | Performed by: SURGERY

## 2017-10-24 PROCEDURE — 93880 EXTRACRANIAL BILAT STUDY: CPT

## 2017-10-24 PROCEDURE — 93880 EXTRACRANIAL BILAT STUDY: CPT | Performed by: SURGERY

## 2017-10-24 PROCEDURE — 93970 EXTREMITY STUDY: CPT

## 2017-10-25 ENCOUNTER — TELEPHONE (OUTPATIENT)
Dept: NEUROLOGY | Facility: CLINIC | Age: 82
End: 2017-10-25

## 2017-10-25 DIAGNOSIS — I65.23 BILATERAL CAROTID ARTERY STENOSIS: Primary | ICD-10-CM

## 2017-10-25 NOTE — TELEPHONE ENCOUNTER
----- Message from LUC George sent at 10/25/2017  9:16 AM CDT -----  I attempted to call patient and no answer. Please call patient that I would like to refer to vascular surgeryLIGIA also please instruct to f/u with PCP  For chronic DVT RLE (she takes xarelto) and send Le dopler to PCP. thanks

## 2017-10-25 NOTE — TELEPHONE ENCOUNTER
I did call and speak with Elizabeth. She did voice understanding and is agreeable with seeing a Vascular Surgeon. I did tell her that office will contact her to schedule an appointment.

## 2017-10-25 NOTE — TELEPHONE ENCOUNTER
----- Message from LUC George sent at 10/25/2017  9:18 AM CDT -----  Please send report to PCP and instruct patient to f/u with PCP ASAP. aly shows chronic DVT RLE and similar to last milton 2016.

## 2017-11-16 ENCOUNTER — OFFICE VISIT (OUTPATIENT)
Dept: VASCULAR SURGERY | Facility: CLINIC | Age: 82
End: 2017-11-16

## 2017-11-16 VITALS
HEART RATE: 96 BPM | SYSTOLIC BLOOD PRESSURE: 132 MMHG | BODY MASS INDEX: 26.22 KG/M2 | DIASTOLIC BLOOD PRESSURE: 78 MMHG | WEIGHT: 148 LBS | HEIGHT: 63 IN

## 2017-11-16 DIAGNOSIS — I10 ESSENTIAL HYPERTENSION: ICD-10-CM

## 2017-11-16 DIAGNOSIS — I63.332 CEREBROVASCULAR ACCIDENT (CVA) DUE TO THROMBOSIS OF LEFT POSTERIOR CEREBRAL ARTERY (HCC): ICD-10-CM

## 2017-11-16 DIAGNOSIS — I65.23 BILATERAL CAROTID ARTERY STENOSIS: Primary | ICD-10-CM

## 2017-11-16 DIAGNOSIS — I82.5Z1 CHRONIC DEEP VEIN THROMBOSIS (DVT) OF DISTAL VEIN OF RIGHT LOWER EXTREMITY (HCC): ICD-10-CM

## 2017-11-16 PROCEDURE — 99213 OFFICE O/P EST LOW 20 MIN: CPT | Performed by: NURSE PRACTITIONER

## 2017-11-16 NOTE — PROGRESS NOTES
11/16/2017      Wendy Soliz, APRN  [address]  [facility] [address]  [place] [address]    Elizabeth Jones  7/16/1933    Chief Complaint   Patient presents with   • Carotid Artery Disease     Denies new stroke/TIA symptoms.History of TIA.       Dear Wendy Soliz, APRN:      HPI  I had the pleasure of seeing your patient Elizabeth Jones in the office today.  Thank you kindly for this consultation.  As you recall, Elizabeth Jones is a 84 y.o.  female who you are currently following for stroke.  She has a history fo stroke in 12/2016, and TIA in 2015.  She also has a history of DVT, chronically taking Xarelto.  She denies any strokelike symptoms, however does complain of imbalance. She states she has had some trouble with inner ear problems previously.    Past Medical History:   Diagnosis Date   • Carotid stenosis    • CLL (chronic lymphocytic leukemia)    • Dyslipidemia    • Hypertension    • Lupus    • Shingles    • Sjogren-Trey syndrome    • Stroke    • TIA (transient ischemic attack)    • TIA (transient ischemic attack)        Past Surgical History:   Procedure Laterality Date   • APPENDECTOMY     • HYSTERECTOMY     • TONSILLECTOMY         Family History   Problem Relation Age of Onset   • Heart disease Mother    • Cancer Father        Social History     Social History   • Marital status:      Spouse name: N/A   • Number of children: N/A   • Years of education: N/A     Occupational History   • Not on file.     Social History Main Topics   • Smoking status: Never Smoker   • Smokeless tobacco: Never Used   • Alcohol use No   • Drug use: No   • Sexual activity: Defer     Other Topics Concern   • Not on file     Social History Narrative       Allergies   Allergen Reactions   • Penicillins    • Sulfa Antibiotics        Prior to Admission medications    Medication Sig Start Date End Date Taking? Authorizing Provider   aspirin  MG tablet Take 325 mg by mouth daily.   Yes Historical Provider, MD   diazePAM  "(VALIUM) 5 MG tablet Take 5 mg by mouth 2 (Two) Times a Day As Needed for anxiety.   Yes Historical Provider, MD   metoprolol succinate XL (TOPROL-XL) 25 MG 24 hr tablet Take 25 mg by mouth Daily.   Yes Historical Provider, MD   Multiple Vitamins-Minerals (MULTIVITAMIN ADULT PO) Take  by mouth.   Yes Historical Provider, MD   Omega-3 Fatty Acids (FISH OIL) 1000 MG capsule capsule Take  by mouth Daily With Breakfast.   Yes Historical Provider, MD   omeprazole (PriLOSEC) 20 MG capsule Take 20 mg by mouth daily.   Yes Historical Provider, MD   rivaroxaban (XARELTO) 20 MG tablet Take 20 mg by mouth daily.   Yes Historical Provider, MD   simvastatin (ZOCOR) 20 MG tablet Take 40 mg by mouth Every Night.   Yes Historical Provider, MD       Review of Systems   Constitutional: Negative.    HENT: Negative.    Eyes: Negative.    Respiratory: Negative.    Cardiovascular: Positive for leg swelling.   Gastrointestinal: Negative.    Endocrine: Negative.    Genitourinary: Negative.    Musculoskeletal: Negative.    Skin: Negative.    Allergic/Immunologic: Negative.    Neurological:        Imbalance   Hematological: Negative.    Psychiatric/Behavioral: Negative.        /78  Pulse 96  Ht 63\" (160 cm)  Wt 148 lb (67.1 kg)  BMI 26.22 kg/m2  Physical Exam   Constitutional: She is oriented to person, place, and time. She appears well-developed and well-nourished. No distress.   HENT:   Head: Normocephalic and atraumatic.   Mouth/Throat: Oropharynx is clear and moist.   Eyes: Pupils are equal, round, and reactive to light. No scleral icterus.   Neck: Normal range of motion. Neck supple. No JVD present. Carotid bruit is not present. No thyromegaly present.   Cardiovascular: Normal rate, regular rhythm, S2 normal, intact distal pulses and normal pulses.  Exam reveals no gallop and no friction rub.    Murmur heard.  Pulmonary/Chest: Effort normal and breath sounds normal.   Abdominal: Soft. Normal aorta and bowel sounds are normal. " There is no hepatosplenomegaly.   Musculoskeletal: Normal range of motion. She exhibits edema (mild BLE).   Neurological: She is alert and oriented to person, place, and time. No cranial nerve deficit.   Skin: Skin is warm and dry. She is not diaphoretic.   Psychiatric: She has a normal mood and affect. Her behavior is normal. Judgment and thought content normal.   Nursing note and vitals reviewed.      Us Carotid Bilateral    Result Date: 10/25/2017  Narrative: History: Carotid occlusive disease      Impression: Impression: 1. There is 50-69% stenosis of the right internal carotid artery. 2. There is 50-69% stenosis of the left internal carotid artery. 3. Antegrade flow is demonstrated in bilateral vertebral arteries.  Comments: Bilateral carotid vertebral arterial duplex scan was performed.  Grayscale imaging shows intimal thickening and calcified elements at the carotid bifurcation. The right internal carotid artery peak systolic velocity is 155 cm/sec. The end-diastolic velocity is 27.5 cm/sec. The right ICA/CCA ratio is approximately 2.17 . These findings correlate with 50-69% stenosis of the right internal carotid artery.  Grayscale imaging shows intimal thickening and calcified elements at the carotid bifurcation. The left internal carotid artery peak systolic velocity is 65.7 cm/sec. The end-diastolic velocity is 19.9 cm/sec. The left ICA/CCA ratio is approximately 0.98 . These findings correlate with 50-69% stenosis of the left internal carotid artery.  Antegrade flow is demonstrated in bilateral vertebral arteries.    This report was finalized on 10/25/2017 07:45 by Dr. Percy Watson MD.    Us Venous Doppler Lower Extremity Bilateral (duplex)    Result Date: 10/25/2017  Narrative: History: Swelling      Impression: Impression: There is evidence of chronic partial deep venous thrombosis in the right lower extremity.  Comments: Bilateral lower extremity venous duplex exam was performed using color Doppler  flow, Doppler waveform analysis, and grayscale imaging, with and without compression. There is evidence of chronic partial deep venous thrombosis in the right lower extremity popliteal vein. There is no evidence of deep venous thrombosis of the left lower extremity. No thrombus is identified in the saphenofemoral junctions and greater saphenous veins bilaterally.   This report was finalized on 10/25/2017 07:46 by Dr. Percy Watson MD.      Patient Active Problem List   Diagnosis   • Transient cerebral ischemia   • Essential hypertension   • Chronic deep vein thrombosis (DVT) of distal vein of right lower extremity   • CLL (chronic lymphocytic leukemia)   • Lupus (systemic lupus erythematosus)   • Cerebrovascular accident (CVA) due to thrombosis of left posterior cerebral artery   • Left carotid stenosis   • Dyslipidemia   • Imbalance         ICD-10-CM ICD-9-CM   1. Bilateral carotid artery stenosis I65.23 433.10     433.30   2. Cerebrovascular accident (CVA) due to thrombosis of left posterior cerebral artery I63.332 434.01   3. Essential hypertension I10 401.9   4. Chronic deep vein thrombosis (DVT) of distal vein of right lower extremity I82.5Z1 453.52         Plan: After thoroughly evaluating Elizabeth Jones, I believe the best course of action is to remain conservative from a vascular standpoint.  We will see Elizabeth DEON Jones back in 1 year with repeat noninvasive testing for continued surveillance.   I did discuss vascular risk factors as they pertain to the progression of vascular disease including controlling her hypertension and cholesterol.  The patient can continue taking her current medication regimen as previously planned.  This was all discussed in full with complete understanding.    Thank you for allowing me to participate in the care of your patient.  Please do not hesitate with any questions or concerns.  I will keep you aware of any further encounters with Elizabeth Jones.        Sincerely  yours,         LUC Wu, DO

## 2017-11-16 NOTE — PATIENT INSTRUCTIONS
"Carotid Artery Disease  The carotid arteries are the two main arteries on either side of the neck that supply blood to the brain. Carotid artery disease, also called carotid artery stenosis, is the narrowing or blockage of one or both carotid arteries. Carotid artery disease increases your risk for a stroke or a transient ischemic attack (TIA). A TIA is an episode in which a waxy, fatty substance that accumulates within the artery (plaque) blocks blood flow to the brain. A TIA is considered a \"warning stroke.\"   CAUSES   · Buildup of plaque inside the carotid arteries (atherosclerosis) (common).  · A weakened outpouching in an artery (aneurysm).  · Inflammation of the carotid artery (arteritis).  · A fibrous growth within the carotid artery (fibromuscular dysplasia).  · Tissue death within the carotid artery due to radiation treatment (post-radiation necrosis).  · Decreased blood flow due to spasms of the carotid artery (vasospasm).  · Separation of the walls of the carotid artery (carotid dissection).  RISK FACTORS  · High cholesterol (dyslipidemia).    · High blood pressure (hypertension).    · Smoking.    · Obesity.    · Diabetes.    · Family history of cardiovascular disease.    · Inactivity or lack of regular exercise.    · Being male. Men have an increased risk of developing atherosclerosis earlier in life than women.    SYMPTOMS   Carotid artery disease does not cause symptoms.  DIAGNOSIS  Diagnosis of carotid artery disease may include:   · A physical exam. Your health care provider may hear an abnormal sound (bruit) when listening to the carotid arteries.    · Specific tests that look at the blood flow in the carotid arteries. These tests include:      Carotid artery ultrasonography.      Carotid or cerebral angiography.      Computerized tomographic angiography (CTA).      Magnetic resonance angiography (MRA).    TREATMENT   Treatment of carotid artery disease can include a combination of treatments. " Treatment options include:  · Surgery. You may have:      A carotid endarterectomy. This is a surgery to remove the blockages in the carotid arteries.      A carotid angioplasty with stenting. This is a nonsurgical interventional procedure. A wire mesh (stent) is used to widen the blocked carotid arteries.    · Medicines to control blood pressure, cholesterol, and reduce blood clotting (antiplatelet therapy).    · Adjusting your diet.    · Lifestyle changes such as:      Quitting smoking.      Exercising as tolerated or as directed by your health care provider.      Controlling and maintaining a good blood pressure.      Keeping cholesterol levels under control.    HOME CARE INSTRUCTIONS   · Take medicines only as directed by your health care provider. Make sure you understand all your medicine instructions. Do not stop your medicines without talking to your health care provider.    · Follow your health care provider's diet instructions. It is important to eat a healthy diet that is low in saturated fats and includes plenty of fresh fruits, vegetables, and lean meats. High-fat, high-sodium foods as well as foods that are fried, overly processed, or have poor nutritional value should be avoided.  · Maintain a healthy weight.    · Stay physically active. It is recommended that you get at least 30 minutes of activity every day.    · Do not use any tobacco products including cigarettes, chewing tobacco, or electronic cigarettes. If you need help quitting, ask your health care provider.  · Limit alcohol use to:      No more than 2 drinks per day for men.      No more than 1 drink per day for nonpregnant women.    · Do not use illegal drugs.    · Keep all follow-up visits as directed by your health care provider.    SEEK IMMEDIATE MEDICAL CARE IF:   You develop TIA or stroke symptoms. These include:   · Sudden weakness or numbness on one side of the body, such as in the face, arm, or leg.    · Sudden confusion.     · Trouble speaking (aphasia) or understanding.    · Sudden trouble seeing out of one or both eyes.    · Sudden trouble walking.    · Dizziness or feeling like you might faint.    · Loss of balance or coordination.    · Sudden severe headache with no known cause.    · Sudden trouble swallowing (dysphagia).    If you have any of these symptoms, call your local emergency services (911 in [place] Do not drive yourself to the clinic or hospital. This is a medical emergency.      This information is not intended to replace advice given to you by your health care provider. Make sure you discuss any questions you have with your health care provider.     Document Released: 03/11/2013 Document Revised: 01/08/2016 Document Reviewed: 06/18/2014  Elsevier Interactive Patient Education ©2017 [employer]

## 2018-04-11 ENCOUNTER — OFFICE VISIT (OUTPATIENT)
Dept: NEUROLOGY | Facility: CLINIC | Age: 83
End: 2018-04-11

## 2018-04-11 VITALS
BODY MASS INDEX: 26.58 KG/M2 | HEIGHT: 63 IN | HEART RATE: 81 BPM | OXYGEN SATURATION: 97 % | DIASTOLIC BLOOD PRESSURE: 80 MMHG | SYSTOLIC BLOOD PRESSURE: 122 MMHG | WEIGHT: 150 LBS

## 2018-04-11 DIAGNOSIS — G45.9 TRANSIENT CEREBRAL ISCHEMIA, UNSPECIFIED TYPE: Primary | ICD-10-CM

## 2018-04-11 DIAGNOSIS — I10 ESSENTIAL HYPERTENSION: ICD-10-CM

## 2018-04-11 DIAGNOSIS — I65.23 BILATERAL CAROTID ARTERY STENOSIS: ICD-10-CM

## 2018-04-11 DIAGNOSIS — E78.5 DYSLIPIDEMIA: ICD-10-CM

## 2018-04-11 DIAGNOSIS — R26.89 IMBALANCE: ICD-10-CM

## 2018-04-11 PROCEDURE — 99214 OFFICE O/P EST MOD 30 MIN: CPT | Performed by: CLINICAL NURSE SPECIALIST

## 2018-04-11 RX ORDER — AMLODIPINE BESYLATE 5 MG/1
5 TABLET ORAL DAILY
Refills: 6 | COMMUNITY
Start: 2018-03-24

## 2018-04-11 RX ORDER — ROSUVASTATIN CALCIUM 40 MG/1
40 TABLET, COATED ORAL NIGHTLY
Refills: 2 | COMMUNITY
Start: 2018-04-02

## 2018-04-11 RX ORDER — MAGNESIUM OXIDE 400 MG/1
400 TABLET ORAL DAILY
Refills: 2 | COMMUNITY
Start: 2018-01-27

## 2018-04-11 NOTE — PROGRESS NOTES
Subjective     Chief Complaint   Patient presents with   • Stroke     Patient denies any stroke like symptoms. She still has some numbness in her feet from the first stroke.  She claims the swelling in her ankles and feet has gotten better.         Elizabeth Jones is a 84 y.o. female right handed retiree.  She is here today for follow up for TIA and stroke. She was last seen 10/17.  Patient has hx of stroke 12/2016 and TIA in 2015.  She has returned to her baseline. She has hx of DVT and does take Xarelto and denies bleeding.  She did have bilateral LE duplex scan and carotid duplex scan 1/2017 and was referred to vascular. Bilateral carotid stenosis monitored by then. Patient denies falls but does complain of imbalance. She has history of peripheral neuropathy and this is non concerning to her and she denies discomfort. She had refused treatment at last visit and refuses again today.    She denies new stroke symptoms and denies unilateral weakness, numbness, facial weakness, speech changes.     Stroke   This is a new (left occipital stroke 12/ 2016, TIA 2015) problem. The current episode started more than 1 month ago. The problem has been resolved (return to baseline). Pertinent negatives include no arthralgias, chest pain, coughing, fatigue, fever, headaches, myalgias, nausea, neck pain, numbness, sore throat, vomiting or weakness. Associated symptoms comments: Abnormal speech. Exacerbated by: dvt, carotid stenosis, dyslipidemia, HTN. Treatments tried: xarelto, statin. The treatment provided significant relief.   Peripheral Neuropathy   This is a chronic problem. The current episode started more than 1 year ago. The problem occurs daily. Pertinent negatives include no arthralgias, chest pain, coughing, fatigue, fever, headaches, myalgias, nausea, neck pain, numbness, sore throat, vomiting or weakness. Associated symptoms comments: Numbness of bilateral LE distally to proximal to below knees. She has tried nothing  for the symptoms. The treatment provided significant relief.   Neurologic Problem   The patient's pertinent negatives include no syncope or weakness. Primary symptoms comment: peripheral neuropathy. This is a chronic problem. The current episode started more than 1 year ago. The neurological problem developed gradually. The problem has been gradually worsening since onset. There was lower extremity (bilateral) focality noted. Pertinent negatives include no chest pain, confusion, dizziness, fatigue, fever, headaches, nausea, neck pain, shortness of breath or vomiting. (Numbness of bilateral LE distally to proximal to below knees) Past treatments include nothing. Her past medical history is significant for a CVA. (Hx DVt, HTN,GERD)        Current Outpatient Prescriptions   Medication Sig Dispense Refill   • amLODIPine (NORVASC) 5 MG tablet Take 5 mg by mouth Daily.  6   • aspirin  MG tablet Take 325 mg by mouth daily.     • diazePAM (VALIUM) 5 MG tablet Take 5 mg by mouth 2 (Two) Times a Day As Needed for anxiety.     • magnesium oxide (MAG-OX) 400 MG tablet Take 400 mg by mouth Daily.  2   • metoprolol succinate XL (TOPROL-XL) 25 MG 24 hr tablet Take 25 mg by mouth Daily.     • Multiple Vitamins-Minerals (MULTIVITAMIN ADULT PO) Take  by mouth.     • Omega-3 Fatty Acids (FISH OIL) 1000 MG capsule capsule Take  by mouth Daily With Breakfast.     • omeprazole (PriLOSEC) 20 MG capsule Take 20 mg by mouth daily.     • rivaroxaban (XARELTO) 20 MG tablet Take 20 mg by mouth daily.     • rosuvastatin (CRESTOR) 40 MG tablet Take 40 mg by mouth Every Night.  2     No current facility-administered medications for this visit.        Past Medical History:   Diagnosis Date   • Carotid stenosis    • CLL (chronic lymphocytic leukemia)    • Dyslipidemia    • Hypertension    • Lupus    • Shingles    • Sjogren-Trey syndrome    • Stroke    • TIA (transient ischemic attack)    • TIA (transient ischemic attack)        Past  "Surgical History:   Procedure Laterality Date   • APPENDECTOMY     • HYSTERECTOMY     • TONSILLECTOMY         family history includes Cancer in her father; Heart disease in her mother.    Social History   Substance Use Topics   • Smoking status: Never Smoker   • Smokeless tobacco: Never Used   • Alcohol use No       Review of Systems   Constitutional: Negative.  Negative for fatigue, fever and unexpected weight change.   HENT: Negative.  Negative for nosebleeds, postnasal drip and sore throat.    Eyes: Negative.  Negative for visual disturbance.   Respiratory: Negative.  Negative for apnea, cough and shortness of breath.    Cardiovascular: Negative for chest pain and leg swelling.   Gastrointestinal: Negative.  Negative for blood in stool, constipation, diarrhea, nausea and vomiting.   Endocrine: Negative.    Genitourinary: Negative.  Negative for dysuria and frequency.   Musculoskeletal: Positive for gait problem (imbalance). Negative for arthralgias, myalgias and neck pain.   Skin: Negative.    Allergic/Immunologic: Negative.    Neurological: Negative for dizziness, tremors, syncope, weakness, numbness and headaches.   Hematological: Negative.  Negative for adenopathy.   Psychiatric/Behavioral: Negative.  Negative for agitation and confusion.   All other systems reviewed and are negative.      Objective     /80   Pulse 81   Ht 160 cm (63\")   Wt 68 kg (150 lb)   SpO2 97%   BMI 26.57 kg/m² , Body mass index is 26.57 kg/m².    Physical Exam   Constitutional: She is oriented to person, place, and time. Vital signs are normal. She appears well-developed and well-nourished. She is cooperative.   HENT:   Head: Normocephalic and atraumatic.   Right Ear: Hearing and external ear normal.   Left Ear: Hearing and external ear normal.   Nose: Nose normal.   Mouth/Throat: Uvula is midline, oropharynx is clear and moist and mucous membranes are normal.   Eyes: Conjunctivae, EOM and lids are normal. Pupils are equal, " round, and reactive to light. Right eye exhibits normal extraocular motion and no nystagmus. Left eye exhibits normal extraocular motion and no nystagmus. Right pupil is round and reactive. Left pupil is round and reactive. Pupils are equal.   Neck: Trachea normal and normal range of motion. Neck supple. Carotid bruit is not present.   Cardiovascular: Normal rate, regular rhythm, S1 normal and S2 normal.    Murmur heard.  Pulses:       Dorsalis pedis pulses are 1+ on the right side, and 1+ on the left side.        Posterior tibial pulses are 1+ on the right side, and 1+ on the left side.   Pulmonary/Chest: Effort normal and breath sounds normal.   Abdominal: Soft. Bowel sounds are normal.   Musculoskeletal: Normal range of motion.       Neurological Sensory Findings - Altered hot/cold left ankle/foot discrimination (distal to proximal to below the knee).Altered hot/cold right ankle/foot discrimination: distal to proximal to below the knee. Altered sharp/dull right ankle/foot discrimination (distal to proximal to below the knee) and altered sharp/dull left ankle/foot discrimination (distal to proximal to below the knee).  Vascular Status -  Her right foot exhibits abnormal foot edema (1+). Her left foot exhibits abnormal foot edema (1+).  Neurological: She is alert and oriented to person, place, and time. She has normal strength and normal reflexes. No cranial nerve deficit or sensory deficit. She displays a negative Romberg sign. Gait (imbalance with gait) abnormal. Abnormal coordination: no ataxia finger to nose intact but difficulty wiht heel to shin. GCS eye subscore is 4. GCS verbal subscore is 5. GCS motor subscore is 6.   Reflex Scores:       Tricep reflexes are 2+ on the right side and 2+ on the left side.       Bicep reflexes are 2+ on the right side and 2+ on the left side.       Brachioradialis reflexes are 2+ on the right side and 2+ on the left side.       Patellar reflexes are 2+ on the right side and  2+ on the left side.       Achilles reflexes are 2+ on the right side and 2+ on the left side.  Awake, alert, no aphasia, no dysarthria    CN II:  Visual fields full.  Pupils equally reactive to light  CN III, IV, VI:  Extraocular Muscles full with no signs of nystagmus  CN V:  Facial sensory is symmetric with no asymetries.  CN VII:  Facial motor symmetric  CN VIII:  Gross hearing intact bilaterally  CN IX:  Palate elevates symmetrically  CN X:  Palate elevates symmetrically  CN XI:  Shoulder shrug symmetric  CN XII:  Tongue is midline on protrusion    Full and symmetric strength bilateral upper and lower extremities   Skin: Skin is warm and dry.   Psychiatric: She has a normal mood and affect. Her speech is normal and behavior is normal. Cognition and memory are normal.   Nursing note and vitals reviewed.         LE DUPLEX SCAN: IMPRESSION:  Impression: There is evidence of chronic partial deep venous thrombosis  in the right lower extremity.      CAROTID DUPLEX: IMPRESSION:  Impression:  1. There is 50-69% stenosis of the right internal carotid artery.  2. There is 50-69% stenosis of the left internal carotid artery.  3. Antegrade flow is demonstrated in bilateral vertebral arteries.    ASSESSMENT/PLAN    Diagnoses and all orders for this visit:    Transient cerebral ischemia, unspecified type    Essential hypertension    Bilateral carotid artery stenosis    Dyslipidemia    Imbalance    Other orders  -     amLODIPine (NORVASC) 5 MG tablet; Take 5 mg by mouth Daily.  -     magnesium oxide (MAG-OX) 400 MG tablet; Take 400 mg by mouth Daily.  -     rosuvastatin (CRESTOR) 40 MG tablet; Take 40 mg by mouth Every Night.    1. Continue with Xarelto per PCP  2. Continue with BP management for systolic BP goal < 140. Managed by PCP  3. Continue with statin therapy and LDL goal < 70 managed by PCP  4.bilateral carotid stenosis and chronic RLE DVT now monitored by LIGIA CALLE  5. Patient is counseled on stroke signs  and symptoms using FAST and Time Saved is Brain Saved.  6.patient wishes to hold off on treatment for neuropathy   7. Patient denies back pain and declines further treatment at this time for peripheral neuropathy.  8. Secondary stroke prevention discussed.    9. Patient's Body mass index is 26.57 kg/m². BMI is above normal parameters. Follow-up plan includes:  no follow-up required.  10. Fall Risk Assessment  Fallen in past 6 months: 0--> No  Mental Status: 0--> no mental status change  Mobility: 0--> No mobility issues  Medications: 1--> Sedatives  Total Fall Risk Score: 3       allergies and all known medications/prescriptions have been reviewed using resources available on this encounter.    Return in about 9 months (around 1/11/2019).        Wendy Soliz, APRN

## 2018-10-23 ENCOUNTER — TELEPHONE (OUTPATIENT)
Dept: VASCULAR SURGERY | Facility: CLINIC | Age: 83
End: 2018-10-23

## 2018-10-23 NOTE — TELEPHONE ENCOUNTER
Spoke with patient about cancelling her appointment , she stated she had never seen Dr. Watson. I explained that she had seen our APRN  And this time she would see Dr. Watson. She refused and said she did not want to see him nor her and would not be here.

## 2018-11-28 ENCOUNTER — TRANSCRIBE ORDERS (OUTPATIENT)
Dept: ADMINISTRATIVE | Facility: HOSPITAL | Age: 83
End: 2018-11-28

## 2018-11-28 ENCOUNTER — TRANSCRIBE ORDERS (OUTPATIENT)
Dept: GENERAL RADIOLOGY | Facility: HOSPITAL | Age: 83
End: 2018-11-28

## 2018-11-28 ENCOUNTER — HOSPITAL ENCOUNTER (OUTPATIENT)
Dept: ULTRASOUND IMAGING | Facility: HOSPITAL | Age: 83
Discharge: HOME | End: 2018-11-28
Attending: INTERNAL MEDICINE | Admitting: INTERNAL MEDICINE

## 2018-11-28 DIAGNOSIS — I65.29 STENOSIS OF CAROTID ARTERY, UNSPECIFIED LATERALITY: ICD-10-CM

## 2018-11-28 DIAGNOSIS — I65.23 OBSTRUCTION OF CAROTID ARTERY ON BOTH SIDES: ICD-10-CM

## 2018-11-28 DIAGNOSIS — I65.23 OBSTRUCTION OF CAROTID ARTERY ON BOTH SIDES: Primary | ICD-10-CM

## 2018-11-28 DIAGNOSIS — R55 SYNCOPE AND COLLAPSE: Primary | ICD-10-CM

## 2018-11-28 PROCEDURE — 93880 EXTRACRANIAL BILAT STUDY: CPT

## 2018-11-29 ENCOUNTER — HOSPITAL ENCOUNTER (OUTPATIENT)
Dept: CARDIOLOGY | Facility: HOSPITAL | Age: 83
Discharge: HOME | End: 2018-11-29
Attending: INTERNAL MEDICINE | Admitting: INTERNAL MEDICINE

## 2018-11-29 DIAGNOSIS — R55 SYNCOPE AND COLLAPSE: ICD-10-CM

## 2018-11-29 PROCEDURE — 93226 XTRNL ECG REC<48 HR SCAN A/R: CPT

## 2018-11-29 PROCEDURE — 93225 XTRNL ECG REC<48 HRS REC: CPT

## 2018-12-07 PROCEDURE — 93227 XTRNL ECG REC<48 HR R&I: CPT | Performed by: INTERNAL MEDICINE

## 2019-07-02 ENCOUNTER — TRANSCRIBE ORDERS (OUTPATIENT)
Dept: ADMINISTRATIVE | Facility: HOSPITAL | Age: 84
End: 2019-07-02

## 2019-07-02 DIAGNOSIS — R60.0 LOCALIZED EDEMA: ICD-10-CM

## 2019-07-02 DIAGNOSIS — I10 HYPERTENSION, UNSPECIFIED TYPE: Primary | ICD-10-CM

## 2019-07-11 ENCOUNTER — HOSPITAL ENCOUNTER (OUTPATIENT)
Dept: CARDIOLOGY | Facility: HOSPITAL | Age: 84
Discharge: HOME | End: 2019-07-11
Admitting: FAMILY MEDICINE

## 2019-07-11 VITALS
DIASTOLIC BLOOD PRESSURE: 89 MMHG | HEIGHT: 63 IN | BODY MASS INDEX: 26.58 KG/M2 | SYSTOLIC BLOOD PRESSURE: 143 MMHG | WEIGHT: 150 LBS

## 2019-07-11 DIAGNOSIS — R60.0 LOCALIZED EDEMA: ICD-10-CM

## 2019-07-11 DIAGNOSIS — I10 HYPERTENSION, UNSPECIFIED TYPE: ICD-10-CM

## 2019-07-11 PROCEDURE — 93306 TTE W/DOPPLER COMPLETE: CPT

## 2019-07-11 PROCEDURE — 93306 TTE W/DOPPLER COMPLETE: CPT | Performed by: INTERNAL MEDICINE

## 2019-07-13 LAB
BH CV ECHO MEAS - AO MAX PG (FULL): 28.5 MMHG
BH CV ECHO MEAS - AO MAX PG: 30.9 MMHG
BH CV ECHO MEAS - AO MEAN PG (FULL): 16.7 MMHG
BH CV ECHO MEAS - AO MEAN PG: 18.7 MMHG
BH CV ECHO MEAS - AO ROOT AREA (BSA CORRECTED): 1.7
BH CV ECHO MEAS - AO ROOT AREA: 6.6 CM^2
BH CV ECHO MEAS - AO ROOT DIAM: 2.9 CM
BH CV ECHO MEAS - AO V2 MAX: 278 CM/SEC
BH CV ECHO MEAS - AO V2 MEAN: 206.7 CM/SEC
BH CV ECHO MEAS - AO V2 VTI: 64.9 CM
BH CV ECHO MEAS - AVA(I,A): 0.82 CM^2
BH CV ECHO MEAS - AVA(I,D): 0.82 CM^2
BH CV ECHO MEAS - AVA(V,A): 0.87 CM^2
BH CV ECHO MEAS - AVA(V,D): 0.87 CM^2
BH CV ECHO MEAS - BSA(HAYCOCK): 1.8 M^2
BH CV ECHO MEAS - BSA: 1.7 M^2
BH CV ECHO MEAS - BZI_BMI: 26.6 KILOGRAMS/M^2
BH CV ECHO MEAS - BZI_METRIC_HEIGHT: 160 CM
BH CV ECHO MEAS - BZI_METRIC_WEIGHT: 68 KG
BH CV ECHO MEAS - EDV(CUBED): 78.4 ML
BH CV ECHO MEAS - EDV(MOD-SP4): 30.9 ML
BH CV ECHO MEAS - EDV(TEICH): 82.2 ML
BH CV ECHO MEAS - EF(CUBED): 69.5 %
BH CV ECHO MEAS - EF(MOD-SP4): 56.3 %
BH CV ECHO MEAS - EF(TEICH): 61.5 %
BH CV ECHO MEAS - ESV(CUBED): 23.9 ML
BH CV ECHO MEAS - ESV(MOD-SP4): 13.5 ML
BH CV ECHO MEAS - ESV(TEICH): 31.7 ML
BH CV ECHO MEAS - FS: 32.7 %
BH CV ECHO MEAS - IVS/LVPW: 0.9
BH CV ECHO MEAS - IVSD: 1.2 CM
BH CV ECHO MEAS - LA DIMENSION: 3.1 CM
BH CV ECHO MEAS - LA/AO: 1.1
BH CV ECHO MEAS - LAT PEAK E' VEL: 3.4 CM/SEC
BH CV ECHO MEAS - LV DIASTOLIC VOL/BSA (35-75): 18.1 ML/M^2
BH CV ECHO MEAS - LV MASS(C)D: 176.7 GRAMS
BH CV ECHO MEAS - LV MASS(C)DI: 103.3 GRAMS/M^2
BH CV ECHO MEAS - LV MAX PG: 2.4 MMHG
BH CV ECHO MEAS - LV MEAN PG: 2 MMHG
BH CV ECHO MEAS - LV SYSTOLIC VOL/BSA (12-30): 7.9 ML/M^2
BH CV ECHO MEAS - LV V1 MAX: 77.3 CM/SEC
BH CV ECHO MEAS - LV V1 MEAN: 58.2 CM/SEC
BH CV ECHO MEAS - LV V1 VTI: 17 CM
BH CV ECHO MEAS - LVIDD: 4.3 CM
BH CV ECHO MEAS - LVIDS: 2.9 CM
BH CV ECHO MEAS - LVLD AP4: 6 CM
BH CV ECHO MEAS - LVLS AP4: 5 CM
BH CV ECHO MEAS - LVOT AREA (M): 3.1 CM^2
BH CV ECHO MEAS - LVOT AREA: 3.1 CM^2
BH CV ECHO MEAS - LVOT DIAM: 2 CM
BH CV ECHO MEAS - LVPWD: 1.2 CM
BH CV ECHO MEAS - MED PEAK E' VEL: 3.81 CM/SEC
BH CV ECHO MEAS - MV A MAX VEL: 125 CM/SEC
BH CV ECHO MEAS - MV DEC SLOPE: 159 CM/SEC^2
BH CV ECHO MEAS - MV DEC TIME: 0.28 SEC
BH CV ECHO MEAS - MV E MAX VEL: 74.7 CM/SEC
BH CV ECHO MEAS - MV E/A: 0.6
BH CV ECHO MEAS - MV MAX PG: 5.1 MMHG
BH CV ECHO MEAS - MV MEAN PG: 2 MMHG
BH CV ECHO MEAS - MV P1/2T MAX VEL: 65 CM/SEC
BH CV ECHO MEAS - MV P1/2T: 119.7 MSEC
BH CV ECHO MEAS - MV V2 MAX: 113 CM/SEC
BH CV ECHO MEAS - MV V2 MEAN: 63.9 CM/SEC
BH CV ECHO MEAS - MV V2 VTI: 26.7 CM
BH CV ECHO MEAS - MVA P1/2T LCG: 3.4 CM^2
BH CV ECHO MEAS - MVA(P1/2T): 1.8 CM^2
BH CV ECHO MEAS - MVA(VTI): 2 CM^2
BH CV ECHO MEAS - PA MAX PG: 1.1 MMHG
BH CV ECHO MEAS - PA V2 MAX: 51.4 CM/SEC
BH CV ECHO MEAS - RAP SYSTOLE: 5 MMHG
BH CV ECHO MEAS - RVSP: 20.7 MMHG
BH CV ECHO MEAS - SI(AO): 250.6 ML/M^2
BH CV ECHO MEAS - SI(CUBED): 31.9 ML/M^2
BH CV ECHO MEAS - SI(LVOT): 31.2 ML/M^2
BH CV ECHO MEAS - SI(MOD-SP4): 10.2 ML/M^2
BH CV ECHO MEAS - SI(TEICH): 29.5 ML/M^2
BH CV ECHO MEAS - SV(AO): 428.9 ML
BH CV ECHO MEAS - SV(CUBED): 54.5 ML
BH CV ECHO MEAS - SV(LVOT): 53.4 ML
BH CV ECHO MEAS - SV(MOD-SP4): 17.4 ML
BH CV ECHO MEAS - SV(TEICH): 50.5 ML
BH CV ECHO MEAS - TR MAX VEL: 198 CM/SEC
BH CV ECHO MEASUREMENTS AVERAGE E/E' RATIO: 20.72
LEFT ATRIUM VOLUME INDEX: 20.7 ML/M2
LEFT ATRIUM VOLUME: 35.4 CM3
LV EF 2D ECHO EST: 65 %
MAXIMAL PREDICTED HEART RATE: 135 BPM
STRESS TARGET HR: 115 BPM